# Patient Record
Sex: FEMALE | Race: WHITE | NOT HISPANIC OR LATINO | Employment: FULL TIME | URBAN - METROPOLITAN AREA
[De-identification: names, ages, dates, MRNs, and addresses within clinical notes are randomized per-mention and may not be internally consistent; named-entity substitution may affect disease eponyms.]

---

## 2018-11-03 ENCOUNTER — HOSPITAL ENCOUNTER (EMERGENCY)
Facility: HOSPITAL | Age: 18
Discharge: HOME | End: 2018-11-03
Attending: EMERGENCY MEDICINE | Admitting: EMERGENCY MEDICINE

## 2018-11-03 VITALS
HEART RATE: 105 BPM | OXYGEN SATURATION: 99 % | SYSTOLIC BLOOD PRESSURE: 129 MMHG | TEMPERATURE: 97.9 F | WEIGHT: 120.1 LBS | DIASTOLIC BLOOD PRESSURE: 79 MMHG | RESPIRATION RATE: 16 BRPM

## 2018-11-03 DIAGNOSIS — H65.05 RECURRENT ACUTE SEROUS OTITIS MEDIA OF LEFT EAR: ICD-10-CM

## 2018-11-03 DIAGNOSIS — H92.02 ACUTE OTALGIA, LEFT: Primary | ICD-10-CM

## 2018-11-03 PROCEDURE — 99282 EMERGENCY DEPT VISIT SF MDM: CPT

## 2018-11-03 RX ORDER — NAPROXEN 500 MG/1
500 TABLET ORAL 2 TIMES DAILY WITH MEALS
Qty: 20 TABLET | Refills: 0 | Status: SHIPPED | OUTPATIENT
Start: 2018-11-03

## 2018-11-03 RX ORDER — FEXOFENADINE HCL AND PSEUDOEPHEDRINE HCI 180; 240 MG/1; MG/1
1 TABLET, EXTENDED RELEASE ORAL DAILY
Qty: 14 TABLET | Refills: 0 | Status: SHIPPED | OUTPATIENT
Start: 2018-11-03

## 2018-11-03 NOTE — ED PROVIDER NOTES
MD ATTESTATION NOTE  HPI: Pt states that she has left ear pain that started 2-3 days ago. Pt states that she has felt that her left lymph nodes have felt swollen.     PE:Pt is resting comfortably, in no distress, and without focal neurologic deficit, clear fluid behind an intact TM without erythema or bulging, no notable cervical lymphadenopathy.    Plan: 0516- Plan to discharge.    The BRANDY and I have discussed this patient's history, physical exam, and treatment plan.  I have reviewed the documentation and personally had a face to face interaction with the patient. I affirm the documentation and agree with the treatment and plan.  The attached note describes my personal findings.    Documentation assistance provided by juliann Canales for Dr. Ta.  Information recorded by the scribe was done at my direction and has been verified and validated by me.       Nabeel Canales  11/03/18 0517       Sunil Ta MD  11/03/18 0625

## 2018-11-03 NOTE — ED PROVIDER NOTES
" EMERGENCY DEPARTMENT ENCOUNTER    CHIEF COMPLAINT  Chief Complaint: L ear pain  History given by: Pt  History limited by: Nothing  Room Number: 30/30  PMD: Provider, No Known      HPI:  Pt is a 18 y.o. female who presents complaining of sharp L ear pain which began a few days ago and is worse when the pt eats, drinks, or talks. The pt confirms headache and sore lymph node behind her L ear, and denies fever. The pt has a h/o frequent ear infections since she was younger.      Duration:  2-3 days  Onset: Gradual  Timing: Constant   Location: L ear  Quality: \"pain\"  Intensity/Severity: Moderate  Progression: No change  Associated Symptoms: Headache, sore lymph nodes  Aggravating Factors: Eating, chewing, talking  Alleviating Factors: None   Previous Episodes: The pt has a h/o ear infections.   Treatment before arrival: None    PAST MEDICAL HISTORY  Active Ambulatory Problems     Diagnosis Date Noted   • No Active Ambulatory Problems     Resolved Ambulatory Problems     Diagnosis Date Noted   • No Resolved Ambulatory Problems     Past Medical History:   Diagnosis Date   • Asthma    • Kidney stones        PAST SURGICAL HISTORY  Past Surgical History:   Procedure Laterality Date   • URETEROSCOPY STENT INSERTION         FAMILY HISTORY  History reviewed. No pertinent family history.    SOCIAL HISTORY  Social History     Social History   • Marital status: Single     Spouse name: N/A   • Number of children: N/A   • Years of education: N/A     Occupational History   • Not on file.     Social History Main Topics   • Smoking status: Never Smoker   • Smokeless tobacco: Not on file   • Alcohol use No   • Drug use: No   • Sexual activity: Defer     Other Topics Concern   • Not on file     Social History Narrative   • No narrative on file       ALLERGIES  Aspirin    REVIEW OF SYSTEMS  Review of Systems   Constitutional: Negative for fever.   HENT: Positive for ear pain (L).    Respiratory: Negative for shortness of breath.  "   Cardiovascular: Negative for chest pain.   Neurological: Positive for headaches.   Hematological: Positive for adenopathy (Behind L ear).       PHYSICAL EXAM  ED Triage Vitals   Temp Heart Rate Resp BP SpO2   11/03/18 0322 11/03/18 0322 11/03/18 0322 11/03/18 0440 11/03/18 0322   97.9 °F (36.6 °C) 105 16 129/79 99 %      Temp src Heart Rate Source Patient Position BP Location FiO2 (%)   -- 11/03/18 0322 -- -- --    Monitor          Physical Exam   Constitutional: She is oriented to person, place, and time. No distress.   HENT:   Left Ear: Tympanic membrane is bulging. Tympanic membrane is not erythematous. A middle ear effusion is present.   Mouth/Throat: No oropharyngeal exudate or posterior oropharyngeal erythema.   Eyes: EOM are normal.   Neck: Normal range of motion.   Cardiovascular: Normal rate and regular rhythm.    Pulmonary/Chest: Effort normal and breath sounds normal. No respiratory distress.   Lymphadenopathy:   Mild L cervical adenopathy   Neurological: She is alert and oriented to person, place, and time. She has normal sensation and normal strength.   Skin: Skin is warm and dry.   Psychiatric: Affect normal.   Nursing note and vitals reviewed.          PROCEDURES  Procedures      PROGRESS AND CONSULTS     0450 Discussed that I do not see signs of infection, but I believe the pt has serous otitis. Discussed the plan to give the pt ear drops for treatment, and ti discharge the pt. Discussed that the pt should f/u with ENT. The pt agrees with the plan and all questions      MEDICAL DECISION MAKING  Results were reviewed/discussed with the patient and they were also made aware of online access. Pt also made aware that some labs, such as cultures, will not be resulted during ER visit and follow up with PMD is necessary.     MDM  Number of Diagnoses or Management Options  Acute otalgia, left:   Recurrent acute serous otitis media of left ear:      Amount and/or Complexity of Data Reviewed  Decide to  obtain previous medical records or to obtain history from someone other than the patient: yes  Review and summarize past medical records: yes    Patient Progress  Patient progress: improved         DIAGNOSIS  Final diagnoses:   Acute otalgia, left   Recurrent acute serous otitis media of left ear       DISPOSITION  Disposition: Discharged.    Patient discharged in stable condition.    Reviewed implications of results, diagnosis, meds, responsibility to follow up, warning signs and symptoms of possible worsening, potential complications and reasons to return to ER, including new or worsening symptoms.    Patient/Family voiced understanding of above instructions.    Discussed plan for discharge, as there is no emergent indication for admission.  Pt/family is agreeable and understands need for follow up and repeat testing.  Pt is aware that discharge does not mean that nothing is wrong but it indicates no emergency is present and they must continue care with follow-up as given below or physician of their choice.     FOLLOW-UP  Kervin Juárez MD  [address] [address]  [phone]    Schedule an appointment as soon as possible for a visit            Medication List      New Prescriptions    fexofenadine-pseudoephedrine 180-240 MG per 24 hr tablet  Commonly known as:  ALLEGRA-D 24  Take 1 tablet by mouth Daily.     naproxen 500 MG tablet  Commonly known as:  NAPROSYN  Take 1 tablet by mouth 2 (Two) Times a Day With Meals.            Latest Documented Vital Signs:  As of 4:54 AM  BP- 129/79 HR- 105 Temp- 97.9 °F (36.6 °C) O2 sat- 99%    --  Documentation assistance provided by juliann Calix for Surjit Chen PA-C.  Information recorded by the scribe was done at my direction and has been verified and validated by me.     Yanet Calix  11/03/18 5       Surjit Chen PA  11/03/18 9976